# Patient Record
Sex: FEMALE | Race: WHITE | NOT HISPANIC OR LATINO | URBAN - NONMETROPOLITAN AREA
[De-identification: names, ages, dates, MRNs, and addresses within clinical notes are randomized per-mention and may not be internally consistent; named-entity substitution may affect disease eponyms.]

---

## 2022-01-05 ENCOUNTER — LAB OUTSIDE AN ENCOUNTER (OUTPATIENT)
Dept: URBAN - NONMETROPOLITAN AREA CLINIC 2 | Facility: CLINIC | Age: 27
End: 2022-01-05

## 2022-01-05 ENCOUNTER — OFFICE VISIT (OUTPATIENT)
Dept: URBAN - NONMETROPOLITAN AREA CLINIC 2 | Facility: CLINIC | Age: 27
End: 2022-01-05
Payer: OTHER GOVERNMENT

## 2022-01-05 ENCOUNTER — WEB ENCOUNTER (OUTPATIENT)
Dept: URBAN - NONMETROPOLITAN AREA CLINIC 2 | Facility: CLINIC | Age: 27
End: 2022-01-05

## 2022-01-05 DIAGNOSIS — K31.84 GASTROPARESIS: ICD-10-CM

## 2022-01-05 DIAGNOSIS — K21.9 ESOPHAGEAL REFLUX: ICD-10-CM

## 2022-01-05 DIAGNOSIS — K62.5 RECTAL BLEEDING: ICD-10-CM

## 2022-01-05 DIAGNOSIS — R13.10 DYSPHAGIA: ICD-10-CM

## 2022-01-05 PROCEDURE — 99244 OFF/OP CNSLTJ NEW/EST MOD 40: CPT | Performed by: INTERNAL MEDICINE

## 2022-01-05 RX ORDER — SERTRALINE HCL 50 MG
TAKE 1.5 TABLETS BY ORAL ROUTE DAILY TABLET ORAL 1
Qty: 0 | Refills: 0 | Status: ACTIVE | COMMUNITY
Start: 1900-01-01

## 2022-01-05 RX ORDER — METRONIDAZOLE 7.5 MG/G
1 APPLICATION CREAM TOPICAL TWICE A DAY
Qty: 1 TUBE | Refills: 1 | OUTPATIENT
Start: 2022-01-05 | End: 2022-02-02

## 2022-01-05 RX ORDER — METFORMIN HYDROCHLORIDE 500 MG/1
TAKE 1500MG DAILY TABLET, COATED ORAL
Qty: 0 | Refills: 0 | Status: ON HOLD | COMMUNITY
Start: 1900-01-01

## 2022-01-05 RX ORDER — SPIRONOLACTONE 50 MG/1
TAKE 1.5 TABLETS BY ORAL ROUTE DAILY TABLET, FILM COATED ORAL 1
Qty: 0 | Refills: 0 | Status: ON HOLD | COMMUNITY
Start: 1900-01-01

## 2022-01-05 RX ORDER — ESOMEPRAZOLE MAGNESIUM 20 MG
TAKE 1 CAPSULE BY MOUTH TWICE DAILY CAPSULE,DELAYED RELEASE (ENTERIC COATED) ORAL
Qty: 0 | Refills: 0 | Status: ON HOLD | COMMUNITY
Start: 1900-01-01

## 2022-01-05 NOTE — HPI-TODAY'S VISIT:
1/5/2022 Ming is referred by Dr. Louie Barajas for consultation of rectal bleeding and rectal pain. A copy of this referral will be sent to the referring MD.  Over the past 6 months she has had bright red blood per rectum with bowel movements along with a tearing sensation.  At times she will may bleed for several hours.  She has a bowel movement most days.  She does take Colace as needed.  We saw her back in 2016 and diagnosed her with gastroparesis.  She has been managing this with small meals and amitriptyline.  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  She is on multiple anticholinergic medications.  She does think that the Colace seems to help, she did not know she could take this regularly.  She is never had a colonoscopy, she has no weight loss or family history.  Today she is doing well otherwise but is concerned about the bleeding.  MB

## 2022-01-06 LAB
BASO (ABSOLUTE): 0.1
BASOS: 1
EOS (ABSOLUTE): 0.1
EOS: 1
HEMATOCRIT: 40.3
HEMATOLOGY COMMENTS:: (no result)
HEMOGLOBIN: 13.1
IMMATURE CELLS: (no result)
IMMATURE GRANS (ABS): 0
IMMATURE GRANULOCYTES: 0
LYMPHS (ABSOLUTE): 3.1
LYMPHS: 38
MCH: 30.4
MCHC: 32.5
MCV: 94
MONOCYTES(ABSOLUTE): 0.7
MONOCYTES: 9
NEUTROPHILS (ABSOLUTE): 4.3
NEUTROPHILS: 51
NRBC: (no result)
PLATELETS: 350
RBC: 4.31
RDW: 11.9
WBC: 8.3

## 2022-01-26 ENCOUNTER — OFFICE VISIT (OUTPATIENT)
Dept: URBAN - NONMETROPOLITAN AREA SURGERY CENTER 1 | Facility: SURGERY CENTER | Age: 27
End: 2022-01-26
Payer: OTHER GOVERNMENT

## 2022-01-26 DIAGNOSIS — K62.5 ANAL BLEEDING: ICD-10-CM

## 2022-01-26 DIAGNOSIS — K59.09 CHANGE IN BOWEL MOVEMENTS INTERMITTENT CONSTIPATION. URGENCY IN THE MORNING.: ICD-10-CM

## 2022-01-26 PROCEDURE — 45378 DIAGNOSTIC COLONOSCOPY: CPT | Performed by: INTERNAL MEDICINE

## 2022-01-26 PROCEDURE — G8907 PT DOC NO EVENTS ON DISCHARG: HCPCS | Performed by: INTERNAL MEDICINE

## 2022-02-24 ENCOUNTER — OFFICE VISIT (OUTPATIENT)
Dept: URBAN - NONMETROPOLITAN AREA CLINIC 2 | Facility: CLINIC | Age: 27
End: 2022-02-24
Payer: OTHER GOVERNMENT

## 2022-02-24 VITALS
HEART RATE: 79 BPM | WEIGHT: 175 LBS | SYSTOLIC BLOOD PRESSURE: 108 MMHG | TEMPERATURE: 96.5 F | HEIGHT: 63 IN | DIASTOLIC BLOOD PRESSURE: 72 MMHG | BODY MASS INDEX: 31.01 KG/M2

## 2022-02-24 DIAGNOSIS — K21.9 ESOPHAGEAL REFLUX: ICD-10-CM

## 2022-02-24 DIAGNOSIS — K59.01 SLOW TRANSIT CONSTIPATION: ICD-10-CM

## 2022-02-24 DIAGNOSIS — K62.5 RECTAL BLEEDING: ICD-10-CM

## 2022-02-24 DIAGNOSIS — R13.10 DYSPHAGIA: ICD-10-CM

## 2022-02-24 DIAGNOSIS — K31.84 GASTROPARESIS: ICD-10-CM

## 2022-02-24 PROCEDURE — 99214 OFFICE O/P EST MOD 30 MIN: CPT | Performed by: NURSE PRACTITIONER

## 2022-02-24 RX ORDER — ONDANSETRON HYDROCHLORIDE 4 MG/1
1 TABLET TABLET, FILM COATED ORAL ONCE A DAY
Qty: 30 | OUTPATIENT
Start: 2022-02-24

## 2022-02-24 RX ORDER — SERTRALINE HCL 50 MG
TAKE 1.5 TABLETS BY ORAL ROUTE DAILY TABLET ORAL 1
Qty: 0 | Refills: 0 | Status: ACTIVE | COMMUNITY
Start: 1900-01-01

## 2022-02-24 RX ORDER — SPIRONOLACTONE 50 MG/1
TAKE 1.5 TABLETS BY ORAL ROUTE DAILY TABLET, FILM COATED ORAL 1
Qty: 0 | Refills: 0 | Status: ON HOLD | COMMUNITY
Start: 1900-01-01

## 2022-02-24 RX ORDER — METFORMIN HYDROCHLORIDE 500 MG/1
TAKE 1500MG DAILY TABLET, COATED ORAL
Qty: 0 | Refills: 0 | Status: ON HOLD | COMMUNITY
Start: 1900-01-01

## 2022-02-24 RX ORDER — ESOMEPRAZOLE MAGNESIUM 20 MG
TAKE 1 CAPSULE BY MOUTH TWICE DAILY CAPSULE,DELAYED RELEASE (ENTERIC COATED) ORAL
Qty: 0 | Refills: 0 | Status: ON HOLD | COMMUNITY
Start: 1900-01-01

## 2022-02-24 RX ORDER — PLECANATIDE 3 MG/1
1 TABLET TABLET ORAL ONCE A DAY
Qty: 90 TABLET | Refills: 3 | OUTPATIENT
Start: 2022-02-24 | End: 2023-02-19

## 2022-02-24 NOTE — HPI-TODAY'S VISIT:
1/5/2022 Ming is referred by Dr. Louie Barajas for consultation of rectal bleeding and rectal pain. A copy of this referral will be sent to the referring MD.  Over the past 6 months she has had bright red blood per rectum with bowel movements along with a tearing sensation.  At times she will may bleed for several hours.  She has a bowel movement most days.  She does take Colace as needed.  We saw her back in 2016 and diagnosed her with gastroparesis.  She has been managing this with small meals and amitriptyline.  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  She is on multiple anticholinergic medications.  She does think that the Colace seems to help, she did not know she could take this regularly.  She is never had a colonoscopy, she has no weight loss or family history.  Today she is doing well otherwise but is concerned about the bleeding.  MB  2/24/2022 Ming presents for follow-up of rectal bleeding.  Since her last visit her colonoscopy is normal other than a fissure.  She is started eating oatmeal in the mornings and is having a more regular bowel movement but still has to strain at times.  She has failed MiraLAX and Colace in the past.  She would like to give Trulance 3 mg a try.  She does have nausea vomiting with her migraines and after binge drinking.  She requests a refill of Zofran.  Today she is doing well otherwise with no new GI complaints.  She would like to try Trulance.  MB

## 2022-08-16 ENCOUNTER — TELEPHONE ENCOUNTER (OUTPATIENT)
Dept: URBAN - NONMETROPOLITAN AREA CLINIC 2 | Facility: CLINIC | Age: 27
End: 2022-08-16

## 2022-08-16 RX ORDER — ONDANSETRON HYDROCHLORIDE 4 MG/1
1 TABLET TABLET, FILM COATED ORAL ONCE A DAY
Qty: 30 TABLET | Refills: 2
Start: 2022-02-24

## 2022-08-16 RX ORDER — PLECANATIDE 3 MG/1
1 TABLET TABLET ORAL ONCE A DAY
Qty: 90 TABLET | Refills: 3
Start: 2022-02-24 | End: 2023-08-11

## 2022-08-18 ENCOUNTER — OFFICE VISIT (OUTPATIENT)
Dept: URBAN - NONMETROPOLITAN AREA CLINIC 2 | Facility: CLINIC | Age: 27
End: 2022-08-18

## 2022-08-30 ENCOUNTER — TELEPHONE ENCOUNTER (OUTPATIENT)
Dept: URBAN - NONMETROPOLITAN AREA CLINIC 13 | Facility: CLINIC | Age: 27
End: 2022-08-30

## 2022-08-30 RX ORDER — ONDANSETRON 4 MG/1
1 TABLET ON THE TONGUE AND ALLOW TO DISSOLVE TABLET, ORALLY DISINTEGRATING ORAL
Qty: 60 TABLETS | Refills: 2 | OUTPATIENT
Start: 2022-08-31

## 2022-08-30 RX ORDER — ONDANSETRON HYDROCHLORIDE 4 MG/1
1 TABLET TABLET, FILM COATED ORAL ONCE A DAY
Qty: 30 TABLET | Refills: 2
Start: 2022-02-24

## 2023-01-12 ENCOUNTER — OFFICE VISIT (OUTPATIENT)
Dept: URBAN - NONMETROPOLITAN AREA CLINIC 2 | Facility: CLINIC | Age: 28
End: 2023-01-12
Payer: OTHER GOVERNMENT

## 2023-01-12 ENCOUNTER — WEB ENCOUNTER (OUTPATIENT)
Dept: URBAN - NONMETROPOLITAN AREA CLINIC 2 | Facility: CLINIC | Age: 28
End: 2023-01-12

## 2023-01-12 VITALS
HEIGHT: 63 IN | SYSTOLIC BLOOD PRESSURE: 124 MMHG | BODY MASS INDEX: 31.01 KG/M2 | HEART RATE: 66 BPM | WEIGHT: 175 LBS | DIASTOLIC BLOOD PRESSURE: 84 MMHG

## 2023-01-12 DIAGNOSIS — K62.5 RECTAL BLEEDING: ICD-10-CM

## 2023-01-12 DIAGNOSIS — K21.9 ESOPHAGEAL REFLUX: ICD-10-CM

## 2023-01-12 DIAGNOSIS — K31.84 GASTROPARESIS: ICD-10-CM

## 2023-01-12 DIAGNOSIS — R13.10 DYSPHAGIA: ICD-10-CM

## 2023-01-12 DIAGNOSIS — K59.01 SLOW TRANSIT CONSTIPATION: ICD-10-CM

## 2023-01-12 PROBLEM — 12063002: Status: ACTIVE | Noted: 2022-01-05

## 2023-01-12 PROCEDURE — 99214 OFFICE O/P EST MOD 30 MIN: CPT | Performed by: NURSE PRACTITIONER

## 2023-01-12 RX ORDER — ONDANSETRON HYDROCHLORIDE 4 MG/1
1 TABLET TABLET, FILM COATED ORAL ONCE A DAY
Qty: 30 TABLET | Refills: 2 | Status: ACTIVE | COMMUNITY
Start: 2022-02-24

## 2023-01-12 RX ORDER — METFORMIN HYDROCHLORIDE 500 MG/1
TAKE 1500MG DAILY TABLET, COATED ORAL
Qty: 0 | Refills: 0 | Status: ON HOLD | COMMUNITY
Start: 1900-01-01

## 2023-01-12 RX ORDER — SERTRALINE HCL 50 MG
TAKE 1.5 TABLETS BY ORAL ROUTE DAILY TABLET ORAL 1
Qty: 0 | Refills: 0 | Status: ACTIVE | COMMUNITY
Start: 1900-01-01

## 2023-01-12 RX ORDER — ESOMEPRAZOLE MAGNESIUM 20 MG
TAKE 1 CAPSULE BY MOUTH TWICE DAILY CAPSULE,DELAYED RELEASE (ENTERIC COATED) ORAL
Qty: 0 | Refills: 0 | Status: ON HOLD | COMMUNITY
Start: 1900-01-01

## 2023-01-12 RX ORDER — SPIRONOLACTONE 50 MG/1
TAKE 1.5 TABLETS BY ORAL ROUTE DAILY TABLET, FILM COATED ORAL 1
Qty: 0 | Refills: 0 | Status: ON HOLD | COMMUNITY
Start: 1900-01-01

## 2023-01-12 RX ORDER — PRUCALOPRIDE 2 MG/1
1 TABLET TABLET, FILM COATED ORAL ONCE A DAY
Qty: 90 TABLET | Refills: 3 | OUTPATIENT
Start: 2023-01-12 | End: 2024-01-07

## 2023-01-12 RX ORDER — PLECANATIDE 3 MG/1
1 TABLET TABLET ORAL ONCE A DAY
Qty: 90 TABLET | Refills: 3 | Status: ACTIVE | COMMUNITY
Start: 2022-02-24 | End: 2023-08-11

## 2023-01-12 RX ORDER — ONDANSETRON 4 MG/1
1 TABLET ON THE TONGUE AND ALLOW TO DISSOLVE TABLET, ORALLY DISINTEGRATING ORAL
Qty: 60 TABLETS | Refills: 2 | Status: ACTIVE | COMMUNITY
Start: 2022-08-31

## 2023-01-12 RX ORDER — ESOMEPRAZOLE MAGNESIUM 20 MG/1
1 CAPSULE CAPSULE, DELAYED RELEASE ORAL ONCE A DAY
Qty: 90 CAPSULE | Refills: 3 | OUTPATIENT
Start: 2023-01-12

## 2023-01-12 NOTE — HPI-TODAY'S VISIT:
1/5/2022 Ming is referred by Dr. Louie Barajas for consultation of rectal bleeding and rectal pain. A copy of this referral will be sent to the referring MD.  Over the past 6 months she has had bright red blood per rectum with bowel movements along with a tearing sensation.  At times she will may bleed for several hours.  She has a bowel movement most days.  She does take Colace as needed.  We saw her back in 2016 and diagnosed her with gastroparesis.  She has been managing this with small meals and amitriptyline.  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  She is on multiple anticholinergic medications.  She does think that the Colace seems to help, she did not know she could take this regularly.  She is never had a colonoscopy, she has no weight loss or family history.  Today she is doing well otherwise but is concerned about the bleeding.  MB  2/24/2022 Ming presents for follow-up of rectal bleeding.  Since her last visit her colonoscopy is normal other than a fissure.  She is started eating oatmeal in the mornings and is having a more regular bowel movement but still has to strain at times.  She has failed MiraLAX and Colace in the past.  She would like to give Trulance 3 mg a try.  She does have nausea vomiting with her migraines and after binge drinking.  She requests a refill of Zofran.  Today she is doing well otherwise with no new GI complaints.  She would like to try Trulance.  MB 1/12/2023 The patient presents for follow-up of reflux gastroparesis and constipation.  Since her last visit the Trulance was $200 a month.  She states that this did help her symptoms but was too expensive.  She has failed stool softeners and MiraLAX in the past.  She states she is recently had a flare of reflux.  She is not taking anything regularly for the symptoms.  Her last EGD was done in 2016 with retained food.  This was not done locally.  Today she agrees to start low-dose PPI.  We will pursue prescription for Motegrity given her gastroparesis and Meghan-Danlos.  If this is not covered consider appeal for Trulance with tier exception versus [employer].  We will follow-up pending her clinical response.  MB

## 2023-01-31 ENCOUNTER — P2P PATIENT RECORD (OUTPATIENT)
Age: 28
End: 2023-01-31

## 2023-01-31 ENCOUNTER — WEB ENCOUNTER (OUTPATIENT)
Dept: URBAN - NONMETROPOLITAN AREA CLINIC 2 | Facility: CLINIC | Age: 28
End: 2023-01-31

## 2023-01-31 RX ORDER — SERTRALINE HCL 50 MG
TAKE 1.5 TABLETS BY ORAL ROUTE DAILY TABLET ORAL 1
Qty: 0 | Refills: 0 | Status: ACTIVE | COMMUNITY
Start: 1900-01-01

## 2023-01-31 RX ORDER — ONDANSETRON 4 MG/1
1 TABLET ON THE TONGUE AND ALLOW TO DISSOLVE TABLET, ORALLY DISINTEGRATING ORAL
Qty: 60 TABLETS | Refills: 2 | Status: ACTIVE | COMMUNITY
Start: 2022-08-31

## 2023-01-31 RX ORDER — METRONIDAZOLE 7.5 MG/G
1 APPLICATION CREAM TOPICAL TWICE A DAY
Qty: 1 TUBE | Refills: 1 | OUTPATIENT
Start: 2023-01-31 | End: 2023-02-28

## 2023-01-31 RX ORDER — PLECANATIDE 3 MG/1
1 TABLET TABLET ORAL ONCE A DAY
Qty: 90 TABLET | Refills: 3 | Status: ACTIVE | COMMUNITY
Start: 2022-02-24 | End: 2023-08-11

## 2023-01-31 RX ORDER — SPIRONOLACTONE 50 MG/1
TAKE 1.5 TABLETS BY ORAL ROUTE DAILY TABLET, FILM COATED ORAL 1
Qty: 0 | Refills: 0 | Status: ON HOLD | COMMUNITY
Start: 1900-01-01

## 2023-01-31 RX ORDER — PRUCALOPRIDE 2 MG/1
1 TABLET TABLET, FILM COATED ORAL ONCE A DAY
Qty: 90 TABLET | Refills: 3 | Status: ACTIVE | COMMUNITY
Start: 2023-01-12 | End: 2024-01-07

## 2023-01-31 RX ORDER — METFORMIN HYDROCHLORIDE 500 MG/1
TAKE 1500MG DAILY TABLET, COATED ORAL
Qty: 0 | Refills: 0 | Status: ON HOLD | COMMUNITY
Start: 1900-01-01

## 2023-01-31 RX ORDER — ONDANSETRON HYDROCHLORIDE 4 MG/1
1 TABLET TABLET, FILM COATED ORAL ONCE A DAY
Qty: 30 TABLET | Refills: 2 | Status: ACTIVE | COMMUNITY
Start: 2022-02-24

## 2023-01-31 RX ORDER — ESOMEPRAZOLE MAGNESIUM 20 MG/1
1 CAPSULE CAPSULE, DELAYED RELEASE ORAL ONCE A DAY
Qty: 90 CAPSULE | Refills: 3 | Status: ACTIVE | COMMUNITY
Start: 2023-01-12

## 2023-01-31 RX ORDER — ESOMEPRAZOLE MAGNESIUM 20 MG
TAKE 1 CAPSULE BY MOUTH TWICE DAILY CAPSULE,DELAYED RELEASE (ENTERIC COATED) ORAL
Qty: 0 | Refills: 0 | Status: ON HOLD | COMMUNITY
Start: 1900-01-01

## 2023-07-13 ENCOUNTER — OFFICE VISIT (OUTPATIENT)
Dept: URBAN - NONMETROPOLITAN AREA CLINIC 2 | Facility: CLINIC | Age: 28
End: 2023-07-13
Payer: OTHER GOVERNMENT

## 2023-07-13 ENCOUNTER — WEB ENCOUNTER (OUTPATIENT)
Dept: URBAN - NONMETROPOLITAN AREA CLINIC 2 | Facility: CLINIC | Age: 28
End: 2023-07-13

## 2023-07-13 VITALS
DIASTOLIC BLOOD PRESSURE: 69 MMHG | TEMPERATURE: 98.7 F | WEIGHT: 169 LBS | HEIGHT: 63 IN | HEART RATE: 91 BPM | BODY MASS INDEX: 29.95 KG/M2 | SYSTOLIC BLOOD PRESSURE: 100 MMHG

## 2023-07-13 DIAGNOSIS — K31.84 GASTROPARESIS: ICD-10-CM

## 2023-07-13 DIAGNOSIS — K59.01 SLOW TRANSIT CONSTIPATION: ICD-10-CM

## 2023-07-13 DIAGNOSIS — K21.9 ESOPHAGEAL REFLUX: ICD-10-CM

## 2023-07-13 DIAGNOSIS — K62.5 RECTAL BLEEDING: ICD-10-CM

## 2023-07-13 DIAGNOSIS — R13.10 DYSPHAGIA: ICD-10-CM

## 2023-07-13 PROCEDURE — 99214 OFFICE O/P EST MOD 30 MIN: CPT | Performed by: NURSE PRACTITIONER

## 2023-07-13 RX ORDER — PLECANATIDE 3 MG/1
1 TABLET TABLET ORAL ONCE A DAY
Qty: 90 TABLET | Refills: 3 | Status: ACTIVE | COMMUNITY
Start: 2022-02-24 | End: 2023-08-11

## 2023-07-13 RX ORDER — ESOMEPRAZOLE MAGNESIUM 20 MG
TAKE 1 CAPSULE BY MOUTH TWICE DAILY CAPSULE,DELAYED RELEASE (ENTERIC COATED) ORAL
Qty: 0 | Refills: 0 | Status: ON HOLD | COMMUNITY
Start: 1900-01-01

## 2023-07-13 RX ORDER — ONDANSETRON HYDROCHLORIDE 4 MG/1
1 TABLET TABLET, FILM COATED ORAL ONCE A DAY
Qty: 30 TABLET | Refills: 2 | Status: ACTIVE | COMMUNITY
Start: 2022-02-24

## 2023-07-13 RX ORDER — ONDANSETRON 4 MG/1
1 TABLET ON THE TONGUE AND ALLOW TO DISSOLVE TABLET, ORALLY DISINTEGRATING ORAL
Qty: 60 TABLETS | Refills: 2 | Status: ACTIVE | COMMUNITY
Start: 2022-08-31

## 2023-07-13 RX ORDER — PRUCALOPRIDE 2 MG/1
1 TABLET TABLET, FILM COATED ORAL ONCE A DAY
Qty: 90 TABLET | Refills: 3 | Status: ACTIVE | COMMUNITY
Start: 2023-01-12 | End: 2024-01-07

## 2023-07-13 RX ORDER — SPIRONOLACTONE 50 MG/1
TAKE 1.5 TABLETS BY ORAL ROUTE DAILY TABLET, FILM COATED ORAL 1
Qty: 0 | Refills: 0 | Status: ON HOLD | COMMUNITY
Start: 1900-01-01

## 2023-07-13 RX ORDER — METFORMIN HYDROCHLORIDE 500 MG/1
TAKE 1500MG DAILY TABLET, COATED ORAL
Qty: 0 | Refills: 0 | Status: ON HOLD | COMMUNITY
Start: 1900-01-01

## 2023-07-13 RX ORDER — ESOMEPRAZOLE MAGNESIUM 20 MG/1
1 CAPSULE CAPSULE, DELAYED RELEASE ORAL ONCE A DAY
Qty: 90 CAPSULE | Refills: 3 | Status: ACTIVE | COMMUNITY
Start: 2023-01-12

## 2023-07-13 RX ORDER — ESOMEPRAZOLE MAGNESIUM 20 MG/1
1 CAPSULE CAPSULE, DELAYED RELEASE ORAL ONCE A DAY
Qty: 90 CAPSULE | Refills: 3 | OUTPATIENT
Start: 2023-07-13

## 2023-07-13 RX ORDER — SERTRALINE HCL 50 MG
TAKE 1.5 TABLETS BY ORAL ROUTE DAILY TABLET ORAL 1
Qty: 0 | Refills: 0 | Status: ACTIVE | COMMUNITY
Start: 1900-01-01

## 2023-07-13 RX ORDER — ONDANSETRON 4 MG/1
1 TABLET ON THE TONGUE AND ALLOW TO DISSOLVE TABLET, ORALLY DISINTEGRATING ORAL
Qty: 60 TABLET | Refills: 3 | OUTPATIENT
Start: 2023-07-13

## 2023-07-13 NOTE — HPI-TODAY'S VISIT:
1/5/2022 Ming is referred by Dr. Louie Barajas for consultation of rectal bleeding and rectal pain. A copy of this referral will be sent to the referring MD.  Over the past 6 months she has had bright red blood per rectum with bowel movements along with a tearing sensation.  At times she will may bleed for several hours.  She has a bowel movement most days.  She does take Colace as needed.  We saw her back in 2016 and diagnosed her with gastroparesis.  She has been managing this with small meals and amitriptyline.  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  She is on multiple anticholinergic medications.  She does think that the Colace seems to help, she did not know she could take this regularly.  She is never had a colonoscopy, she has no weight loss or family history.  Today she is doing well otherwise but is concerned about the bleeding.  MB  2/24/2022 Ming presents for follow-up of rectal bleeding.  Since her last visit her colonoscopy is normal other than a fissure.  She is started eating oatmeal in the mornings and is having a more regular bowel movement but still has to strain at times.  She has failed MiraLAX and Colace in the past.  She would like to give Trulance 3 mg a try.  She does have nausea vomiting with her migraines and after binge drinking.  She requests a refill of Zofran.  Today she is doing well otherwise with no new GI complaints.  She would like to try Trulance.  MB 1/12/2023 The patient presents for follow-up of reflux gastroparesis and constipation.  Since her last visit the Trulance was $200 a month.  She states that this did help her symptoms but was too expensive.  She has failed stool softeners and MiraLAX in the past.  She states she is recently had a flare of reflux.  She is not taking anything regularly for the symptoms.  Her last EGD was done in 2016 with retained food.  This was not done locally.  Today she agrees to start low-dose PPI.  We will pursue prescription for Motegrity given her gastroparesis and Meghan-Danlos.  If this is not covered consider appeal for Trulance with tier exception versus [employer].  We will follow-up pending her clinical response.  MB 7/13/2023 Ming presents for follow-up of gastroparesis and CIC.  She is doing great on Motegrity 2 mg daily with a bowel movement most days of the week.  She still has occasional episodes of nausea, her reflux actually improved as far as her symptoms but her dental appointment reveals poor dentition related to reflux.  She agrees to start as omeprazole 20 mg daily.  We will refill her Zofran.  Today she is doing much better, we will appeal for to [employer] coverage of Motegrity given its high expense.  MB

## 2023-08-18 ENCOUNTER — P2P PATIENT RECORD (OUTPATIENT)
Age: 28
End: 2023-08-18

## 2023-08-31 ENCOUNTER — WEB ENCOUNTER (OUTPATIENT)
Dept: URBAN - NONMETROPOLITAN AREA CLINIC 2 | Facility: CLINIC | Age: 28
End: 2023-08-31

## 2023-09-12 ENCOUNTER — TELEPHONE ENCOUNTER (OUTPATIENT)
Dept: URBAN - NONMETROPOLITAN AREA CLINIC 2 | Facility: CLINIC | Age: 28
End: 2023-09-12

## 2023-09-12 RX ORDER — PRUCALOPRIDE 2 MG/1
1 TABLET TABLET, FILM COATED ORAL ONCE A DAY
Qty: 90 TABLET | Refills: 3 | OUTPATIENT
Start: 2023-09-12 | End: 2024-09-06

## 2023-11-27 ENCOUNTER — WEB ENCOUNTER (OUTPATIENT)
Dept: URBAN - NONMETROPOLITAN AREA CLINIC 2 | Facility: CLINIC | Age: 28
End: 2023-11-27

## 2023-11-28 ENCOUNTER — WEB ENCOUNTER (OUTPATIENT)
Dept: URBAN - NONMETROPOLITAN AREA CLINIC 2 | Facility: CLINIC | Age: 28
End: 2023-11-28

## 2023-11-29 LAB
A/G RATIO: 1.9
ABSOLUTE BASOPHILS: 42
ABSOLUTE EOSINOPHILS: 66
ABSOLUTE LYMPHOCYTES: 2166
ABSOLUTE MONOCYTES: 639
ABSOLUTE NEUTROPHILS: 5387
ALBUMIN: 4.5
ALKALINE PHOSPHATASE: 16
ALT (SGPT): 10
AST (SGOT): 14
BASOPHILS: 0.5
BILIRUBIN, TOTAL: 0.5
BUN/CREATININE RATIO: 15
BUN: 15
C-REACTIVE PROTEIN, QUANT: 1.1
CALCIUM: 9.5
CARBON DIOXIDE, TOTAL: 23
CHLORIDE: 100
CREATININE: 1.02
EGFR: 77
EOSINOPHILS: 0.8
GLOBULIN, TOTAL: 2.4
GLUCOSE: 74
HEMATOCRIT: 36.8
HEMOGLOBIN: 12.5
IMMUNOGLOBULIN A: 262
INTERPRETATION: (no result)
LYMPHOCYTES: 26.1
MCH: 30.5
MCHC: 34
MCV: 89.8
MONOCYTES: 7.7
MPV: 10.7
NEUTROPHILS: 64.9
PLATELET COUNT: 356
POTASSIUM: 4.1
PROTEIN, TOTAL: 6.9
RDW: 13.1
RED BLOOD CELL COUNT: 4.1
SED RATE BY MODIFIED: 6
SODIUM: 136
TISSUE TRANSGLUTAMINASE AB, IGA: <1
TSH W/REFLEX TO FT4: 1.39
WHITE BLOOD CELL COUNT: 8.3

## 2023-12-04 ENCOUNTER — OFFICE VISIT (OUTPATIENT)
Dept: URBAN - NONMETROPOLITAN AREA CLINIC 2 | Facility: CLINIC | Age: 28
End: 2023-12-04
Payer: OTHER GOVERNMENT

## 2023-12-04 ENCOUNTER — LAB OUTSIDE AN ENCOUNTER (OUTPATIENT)
Dept: URBAN - NONMETROPOLITAN AREA CLINIC 2 | Facility: CLINIC | Age: 28
End: 2023-12-04

## 2023-12-04 VITALS
BODY MASS INDEX: 27.29 KG/M2 | HEIGHT: 63 IN | DIASTOLIC BLOOD PRESSURE: 76 MMHG | WEIGHT: 154 LBS | HEART RATE: 85 BPM | SYSTOLIC BLOOD PRESSURE: 114 MMHG | TEMPERATURE: 98.6 F

## 2023-12-04 DIAGNOSIS — K21.9 ESOPHAGEAL REFLUX: ICD-10-CM

## 2023-12-04 DIAGNOSIS — K62.5 RECTAL BLEEDING: ICD-10-CM

## 2023-12-04 DIAGNOSIS — R13.10 DYSPHAGIA: ICD-10-CM

## 2023-12-04 DIAGNOSIS — K31.84 GASTROPARESIS: ICD-10-CM

## 2023-12-04 DIAGNOSIS — K59.01 SLOW TRANSIT CONSTIPATION: ICD-10-CM

## 2023-12-04 PROCEDURE — 99214 OFFICE O/P EST MOD 30 MIN: CPT | Performed by: NURSE PRACTITIONER

## 2023-12-04 RX ORDER — ESOMEPRAZOLE MAGNESIUM 20 MG/1
1 CAPSULE CAPSULE, DELAYED RELEASE ORAL ONCE A DAY
Qty: 90 CAPSULE | Refills: 3 | Status: ACTIVE | COMMUNITY
Start: 2023-01-12

## 2023-12-04 RX ORDER — ESOMEPRAZOLE MAGNESIUM 20 MG/1
1 CAPSULE CAPSULE, DELAYED RELEASE ORAL ONCE A DAY
Qty: 90 CAPSULE | Refills: 3 | Status: ACTIVE | COMMUNITY
Start: 2023-07-13

## 2023-12-04 RX ORDER — METOCLOPRAMIDE HYDROCHLORIDE 15 MG/.07ML
1 SPRAY 30 MINUTES BEFORE MEALS AND AT BEDTIME IN ONE NOSTRIL SPRAY NASAL TWICE DAILY
Qty: 1 UNSPECIFIED | Refills: 3 | OUTPATIENT
Start: 2023-12-04

## 2023-12-04 RX ORDER — PRUCALOPRIDE 2 MG/1
1 TABLET TABLET, FILM COATED ORAL ONCE A DAY
Qty: 90 TABLET | Refills: 3 | Status: ACTIVE | COMMUNITY
Start: 2023-09-12 | End: 2024-09-06

## 2023-12-04 RX ORDER — ESOMEPRAZOLE MAGNESIUM 20 MG
TAKE 1 CAPSULE BY MOUTH TWICE DAILY CAPSULE,DELAYED RELEASE (ENTERIC COATED) ORAL
Qty: 0 | Refills: 0 | Status: ON HOLD | COMMUNITY
Start: 1900-01-01

## 2023-12-04 RX ORDER — ONDANSETRON 4 MG/1
1 TABLET ON THE TONGUE AND ALLOW TO DISSOLVE TABLET, ORALLY DISINTEGRATING ORAL
Qty: 60 TABLETS | Refills: 2 | Status: ACTIVE | COMMUNITY
Start: 2022-08-31

## 2023-12-04 RX ORDER — ONDANSETRON HYDROCHLORIDE 4 MG/1
1 TABLET TABLET, FILM COATED ORAL ONCE A DAY
Qty: 30 TABLET | Refills: 2 | Status: ACTIVE | COMMUNITY
Start: 2022-02-24

## 2023-12-04 RX ORDER — SPIRONOLACTONE 50 MG/1
TAKE 1.5 TABLETS BY ORAL ROUTE DAILY TABLET, FILM COATED ORAL 1
Qty: 0 | Refills: 0 | Status: ON HOLD | COMMUNITY
Start: 1900-01-01

## 2023-12-04 RX ORDER — PRUCALOPRIDE 2 MG/1
1 TABLET TABLET, FILM COATED ORAL ONCE A DAY
Qty: 90 TABLET | Refills: 3 | Status: ACTIVE | COMMUNITY
Start: 2023-01-12 | End: 2024-01-07

## 2023-12-04 RX ORDER — SERTRALINE HCL 50 MG
TAKE 1.5 TABLETS BY ORAL ROUTE DAILY TABLET ORAL 1
Qty: 0 | Refills: 0 | Status: ACTIVE | COMMUNITY
Start: 1900-01-01

## 2023-12-04 RX ORDER — ESOMEPRAZOLE MAGNESIUM 20 MG/1
1 CAPSULE CAPSULE, DELAYED RELEASE ORAL TWICE DAILY
Qty: 180 CAPSULES | Refills: 3
Start: 2023-01-12

## 2023-12-04 RX ORDER — METFORMIN HYDROCHLORIDE 500 MG/1
TAKE 1500MG DAILY TABLET, COATED ORAL
Qty: 0 | Refills: 0 | Status: ON HOLD | COMMUNITY
Start: 1900-01-01

## 2023-12-04 RX ORDER — ONDANSETRON 4 MG/1
1 TABLET ON THE TONGUE AND ALLOW TO DISSOLVE TABLET, ORALLY DISINTEGRATING ORAL
Qty: 60 TABLET | Refills: 3 | Status: ACTIVE | COMMUNITY
Start: 2023-07-13

## 2023-12-04 NOTE — HPI-TODAY'S VISIT:
1/5/2022 Ming is referred by Dr. Louie Barajas for consultation of rectal bleeding and rectal pain. A copy of this referral will be sent to the referring MD.  Over the past 6 months she has had bright red blood per rectum with bowel movements along with a tearing sensation.  At times she will may bleed for several hours.  She has a bowel movement most days.  She does take Colace as needed.  We saw her back in 2016 and diagnosed her with gastroparesis.  She has been managing this with small meals and amitriptyline.  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  She is on multiple anticholinergic medications.  She does think that the Colace seems to help, she did not know she could take this regularly.  She is never had a colonoscopy, she has no weight loss or family history.  Today she is doing well otherwise but is concerned about the bleeding.  MB  2/24/2022 Ming presents for follow-up of rectal bleeding.  Since her last visit her colonoscopy is normal other than a fissure.  She is started eating oatmeal in the mornings and is having a more regular bowel movement but still has to strain at times.  She has failed MiraLAX and Colace in the past.  She would like to give Trulance 3 mg a try.  She does have nausea vomiting with her migraines and after binge drinking.  She requests a refill of Zofran.  Today she is doing well otherwise with no new GI complaints.  She would like to try Trulance.  MB 1/12/2023 The patient presents for follow-up of reflux gastroparesis and constipation.  Since her last visit the Trulance was $200 a month.  She states that this did help her symptoms but was too expensive.  She has failed stool softeners and MiraLAX in the past.  She states she is recently had a flare of reflux.  She is not taking anything regularly for the symptoms.  Her last EGD was done in 2016 with retained food.  This was not done locally.  Today she agrees to start low-dose PPI.  We will pursue prescription for Motegrity given her gastroparesis and Meghan-Danlos.  If this is not covered consider appeal for Trulance with tier exception versus [employer].  We will follow-up pending her clinical response.  MB 7/13/2023 Ming presents for follow-up of gastroparesis and CIC.  She is doing great on Motegrity 2 mg daily with a bowel movement most days of the week.  She still has occasional episodes of nausea, her reflux actually improved as far as her symptoms but her dental appointment reveals poor dentition related to reflux.  She agrees to start as omeprazole 20 mg daily.  We will refill her Zofran.  Today she is doing much better, we will appeal for to [employer] coverage of Motegrity given its high expense.  MB 12/4/2023 Lorie presents for follow-up of reflux and gastroparesis with flare.  The September she started applying for grad school and break-up with her boyfriend.  She had increased reflux with gurgling along with nausea and dry heaves and occasional vomiting.  She agrees to increase her as omeprazole to 20 mg twice daily.  She is on metformin for insulin resistance and wants to hold off on weaning this.  She does agree to a trial of Chris OD, we have discussed risk including tardive dyskinesia with metoclopramide.  We will schedule repeat upper endoscopy to rule out eosinophilic esophagitis/gastritis.  We will follow-up pending her response.  Consider a course of Xifaxan, she cannot take amitriptyline due to palpitations associated with this.  MB

## 2023-12-06 LAB — RESULT:: (no result)

## 2023-12-19 ENCOUNTER — WEB ENCOUNTER (OUTPATIENT)
Dept: URBAN - NONMETROPOLITAN AREA CLINIC 2 | Facility: CLINIC | Age: 28
End: 2023-12-19

## 2023-12-20 ENCOUNTER — TELEPHONE ENCOUNTER (OUTPATIENT)
Dept: URBAN - NONMETROPOLITAN AREA CLINIC 2 | Facility: CLINIC | Age: 28
End: 2023-12-20

## 2024-01-04 ENCOUNTER — WEB ENCOUNTER (OUTPATIENT)
Dept: URBAN - NONMETROPOLITAN AREA CLINIC 2 | Facility: CLINIC | Age: 29
End: 2024-01-04

## 2024-01-05 ENCOUNTER — OFFICE VISIT (OUTPATIENT)
Dept: URBAN - NONMETROPOLITAN AREA SURGERY CENTER 1 | Facility: SURGERY CENTER | Age: 29
End: 2024-01-05

## 2024-01-11 ENCOUNTER — LAB OUTSIDE AN ENCOUNTER (OUTPATIENT)
Dept: URBAN - NONMETROPOLITAN AREA CLINIC 2 | Facility: CLINIC | Age: 29
End: 2024-01-11

## 2024-01-11 ENCOUNTER — OFFICE VISIT (OUTPATIENT)
Dept: URBAN - METROPOLITAN AREA MEDICAL CENTER 1 | Facility: MEDICAL CENTER | Age: 29
End: 2024-01-11
Payer: OTHER GOVERNMENT

## 2024-01-11 DIAGNOSIS — K29.60 ADENOPAPILLOMATOSIS GASTRICA: ICD-10-CM

## 2024-01-11 DIAGNOSIS — K22.89 OTHER SPECIFIED DISEASE OF ESOPHAGUS: ICD-10-CM

## 2024-01-11 PROCEDURE — 43239 EGD BIOPSY SINGLE/MULTIPLE: CPT | Performed by: INTERNAL MEDICINE

## 2024-01-11 RX ORDER — ESOMEPRAZOLE MAGNESIUM 20 MG/1
1 CAPSULE CAPSULE, DELAYED RELEASE ORAL TWICE DAILY
Qty: 180 CAPSULES | Refills: 3 | Status: ACTIVE | COMMUNITY
Start: 2023-01-12

## 2024-01-11 RX ORDER — ONDANSETRON HYDROCHLORIDE 4 MG/1
1 TABLET TABLET, FILM COATED ORAL ONCE A DAY
Qty: 30 TABLET | Refills: 2 | Status: ACTIVE | COMMUNITY
Start: 2022-02-24

## 2024-01-11 RX ORDER — ONDANSETRON 4 MG/1
1 TABLET ON THE TONGUE AND ALLOW TO DISSOLVE TABLET, ORALLY DISINTEGRATING ORAL
Qty: 60 TABLET | Refills: 3 | Status: ACTIVE | COMMUNITY
Start: 2023-07-13

## 2024-01-11 RX ORDER — ONDANSETRON 4 MG/1
1 TABLET ON THE TONGUE AND ALLOW TO DISSOLVE TABLET, ORALLY DISINTEGRATING ORAL
Qty: 60 TABLETS | Refills: 2 | Status: ACTIVE | COMMUNITY
Start: 2022-08-31

## 2024-01-11 RX ORDER — SERTRALINE HCL 50 MG
TAKE 1.5 TABLETS BY ORAL ROUTE DAILY TABLET ORAL 1
Qty: 0 | Refills: 0 | Status: ACTIVE | COMMUNITY
Start: 1900-01-01

## 2024-01-11 RX ORDER — PRUCALOPRIDE 2 MG/1
1 TABLET TABLET, FILM COATED ORAL ONCE A DAY
Qty: 90 TABLET | Refills: 3 | Status: ACTIVE | COMMUNITY
Start: 2023-09-12 | End: 2024-09-06

## 2024-01-11 RX ORDER — ESOMEPRAZOLE MAGNESIUM 20 MG/1
1 CAPSULE CAPSULE, DELAYED RELEASE ORAL ONCE A DAY
Qty: 90 CAPSULE | Refills: 3 | Status: ACTIVE | COMMUNITY
Start: 2023-07-13

## 2024-01-11 RX ORDER — METOCLOPRAMIDE HYDROCHLORIDE 15 MG/.07ML
1 SPRAY 30 MINUTES BEFORE MEALS AND AT BEDTIME IN ONE NOSTRIL SPRAY NASAL TWICE DAILY
Qty: 1 UNSPECIFIED | Refills: 3 | Status: ACTIVE | COMMUNITY
Start: 2023-12-04

## 2024-01-11 RX ORDER — SPIRONOLACTONE 50 MG/1
TAKE 1.5 TABLETS BY ORAL ROUTE DAILY TABLET, FILM COATED ORAL 1
Qty: 0 | Refills: 0 | Status: ON HOLD | COMMUNITY
Start: 1900-01-01

## 2024-01-11 RX ORDER — ESOMEPRAZOLE MAGNESIUM 20 MG
TAKE 1 CAPSULE BY MOUTH TWICE DAILY CAPSULE,DELAYED RELEASE (ENTERIC COATED) ORAL
Qty: 0 | Refills: 0 | Status: ON HOLD | COMMUNITY
Start: 1900-01-01

## 2024-01-11 RX ORDER — METFORMIN HYDROCHLORIDE 500 MG/1
TAKE 1500MG DAILY TABLET, COATED ORAL
Qty: 0 | Refills: 0 | Status: ON HOLD | COMMUNITY
Start: 1900-01-01

## 2024-01-12 LAB
AP CASE REPORT: (no result)
AP FINAL DIAGNOSIS: (no result)
AP GROSS DESCRIPTION: (no result)
AP MICROSCOPIC DESCRIPTION: (no result)

## 2024-01-25 ENCOUNTER — OFFICE VISIT (OUTPATIENT)
Dept: URBAN - NONMETROPOLITAN AREA CLINIC 2 | Facility: CLINIC | Age: 29
End: 2024-01-25
Payer: OTHER GOVERNMENT

## 2024-01-25 ENCOUNTER — DASHBOARD ENCOUNTERS (OUTPATIENT)
Age: 29
End: 2024-01-25

## 2024-01-25 VITALS
WEIGHT: 145 LBS | DIASTOLIC BLOOD PRESSURE: 70 MMHG | SYSTOLIC BLOOD PRESSURE: 99 MMHG | TEMPERATURE: 98 F | HEART RATE: 101 BPM | BODY MASS INDEX: 25.69 KG/M2 | HEIGHT: 63 IN

## 2024-01-25 DIAGNOSIS — K59.01 SLOW TRANSIT CONSTIPATION: ICD-10-CM

## 2024-01-25 DIAGNOSIS — R13.10 DYSPHAGIA: ICD-10-CM

## 2024-01-25 DIAGNOSIS — K21.9 ESOPHAGEAL REFLUX: ICD-10-CM

## 2024-01-25 DIAGNOSIS — R19.4 CHANGE IN BOWEL HABITS: ICD-10-CM

## 2024-01-25 DIAGNOSIS — K62.5 RECTAL BLEEDING: ICD-10-CM

## 2024-01-25 DIAGNOSIS — K31.84 GASTROPARESIS: ICD-10-CM

## 2024-01-25 PROBLEM — 235675006: Status: ACTIVE | Noted: 2022-01-05

## 2024-01-25 PROBLEM — 35298007: Status: ACTIVE | Noted: 2022-02-24

## 2024-01-25 PROBLEM — 88111009: Status: ACTIVE | Noted: 2024-01-25

## 2024-01-25 PROCEDURE — 99214 OFFICE O/P EST MOD 30 MIN: CPT | Performed by: NURSE PRACTITIONER

## 2024-01-25 RX ORDER — ONDANSETRON HYDROCHLORIDE 4 MG/1
1 TABLET TABLET, FILM COATED ORAL
Qty: 60 TABLET | Refills: 1 | OUTPATIENT
Start: 2024-01-25

## 2024-01-25 RX ORDER — METFORMIN HYDROCHLORIDE 500 MG/1
TAKE 1500MG DAILY TABLET, COATED ORAL
Qty: 0 | Refills: 0 | Status: ON HOLD | COMMUNITY
Start: 1900-01-01

## 2024-01-25 RX ORDER — PRUCALOPRIDE 2 MG/1
1 TABLET TABLET, FILM COATED ORAL ONCE A DAY
Qty: 90 TABLET | Refills: 3 | Status: ACTIVE | COMMUNITY
Start: 2023-09-12 | End: 2024-09-06

## 2024-01-25 RX ORDER — SERTRALINE HCL 50 MG
TAKE 1.5 TABLETS BY ORAL ROUTE DAILY TABLET ORAL 1
Qty: 0 | Refills: 0 | Status: ACTIVE | COMMUNITY
Start: 1900-01-01

## 2024-01-25 RX ORDER — ESOMEPRAZOLE MAGNESIUM 20 MG
TAKE 1 CAPSULE BY MOUTH TWICE DAILY CAPSULE,DELAYED RELEASE (ENTERIC COATED) ORAL
Qty: 0 | Refills: 0 | Status: ON HOLD | COMMUNITY
Start: 1900-01-01

## 2024-01-25 RX ORDER — METOCLOPRAMIDE HYDROCHLORIDE 15 MG/.07ML
1 SPRAY 30 MINUTES BEFORE MEALS AND AT BEDTIME IN ONE NOSTRIL SPRAY NASAL TWICE DAILY
Qty: 1 UNSPECIFIED | Refills: 3 | Status: ACTIVE | COMMUNITY
Start: 2023-12-04

## 2024-01-25 RX ORDER — ONDANSETRON 4 MG/1
1 TABLET ON THE TONGUE AND ALLOW TO DISSOLVE TABLET, ORALLY DISINTEGRATING ORAL
Qty: 60 TABLETS | Refills: 2 | Status: ACTIVE | COMMUNITY
Start: 2022-08-31

## 2024-01-25 RX ORDER — ONDANSETRON 4 MG/1
1 TABLET ON THE TONGUE AND ALLOW TO DISSOLVE TABLET, ORALLY DISINTEGRATING ORAL
Qty: 60 TABLET | Refills: 3 | Status: ACTIVE | COMMUNITY
Start: 2023-07-13

## 2024-01-25 RX ORDER — METRONIDAZOLE 7.5 MG/G
1 APPLICATION CREAM TOPICAL TWICE A DAY
Qty: 1 UNSPECIFIED | Refills: 0 | OUTPATIENT
Start: 2024-01-25 | End: 2024-02-08

## 2024-01-25 RX ORDER — ESOMEPRAZOLE MAGNESIUM 20 MG/1
1 CAPSULE CAPSULE, DELAYED RELEASE ORAL ONCE A DAY
Qty: 90 CAPSULE | Refills: 3 | Status: ACTIVE | COMMUNITY
Start: 2023-07-13

## 2024-01-25 RX ORDER — ONDANSETRON HYDROCHLORIDE 4 MG/1
1 TABLET TABLET, FILM COATED ORAL ONCE A DAY
Qty: 30 TABLET | Refills: 2 | Status: ACTIVE | COMMUNITY
Start: 2022-02-24

## 2024-01-25 RX ORDER — ESOMEPRAZOLE MAGNESIUM 20 MG/1
1 CAPSULE CAPSULE, DELAYED RELEASE ORAL TWICE DAILY
Qty: 180 CAPSULES | Refills: 3 | Status: ACTIVE | COMMUNITY
Start: 2023-01-12

## 2024-01-25 RX ORDER — SPIRONOLACTONE 50 MG/1
TAKE 1.5 TABLETS BY ORAL ROUTE DAILY TABLET, FILM COATED ORAL 1
Qty: 0 | Refills: 0 | Status: ON HOLD | COMMUNITY
Start: 1900-01-01

## 2024-01-25 NOTE — HPI-TODAY'S VISIT:
1/5/2022 Ming is referred by Dr. Louie Barajas for consultation of rectal bleeding and rectal pain. A copy of this referral will be sent to the referring MD.  Over the past 6 months she has had bright red blood per rectum with bowel movements along with a tearing sensation.  At times she will may bleed for several hours.  She has a bowel movement most days.  She does take Colace as needed.  We saw her back in 2016 and diagnosed her with gastroparesis.  She has been managing this with small meals and amitriptyline.  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  She is on multiple anticholinergic medications.  She does think that the Colace seems to help, she did not know she could take this regularly.  She is never had a colonoscopy, she has no weight loss or family history.  Today she is doing well otherwise but is concerned about the bleeding.  MB  2/24/2022 Ming presents for follow-up of rectal bleeding.  Since her last visit her colonoscopy is normal other than a fissure.  She is started eating oatmeal in the mornings and is having a more regular bowel movement but still has to strain at times.  She has failed MiraLAX and Colace in the past.  She would like to give Trulance 3 mg a try.  She does have nausea vomiting with her migraines and after binge drinking.  She requests a refill of Zofran.  Today she is doing well otherwise with no new GI complaints.  She would like to try Trulance.  MB 1/12/2023 The patient presents for follow-up of reflux gastroparesis and constipation.  Since her last visit the Trulance was $200 a month.  She states that this did help her symptoms but was too expensive.  She has failed stool softeners and MiraLAX in the past.  She states she is recently had a flare of reflux.  She is not taking anything regularly for the symptoms.  Her last EGD was done in 2016 with retained food.  This was not done locally.  Today she agrees to start low-dose PPI.  We will pursue prescription for Motegrity given her gastroparesis and Meghan-Danlos.  If this is not covered consider appeal for Trulance with tier exception versus [employer].  We will follow-up pending her clinical response.  MB 7/13/2023 Ming presents for follow-up of gastroparesis and CIC.  She is doing great on Motegrity 2 mg daily with a bowel movement most days of the week.  She still has occasional episodes of nausea, her reflux actually improved as far as her symptoms but her dental appointment reveals poor dentition related to reflux.  She agrees to start as omeprazole 20 mg daily.  We will refill her Zofran.  Today she is doing much better, we will appeal for to [facility] coverage of Motegrity given its high expense.  MB 12/4/2023 Lorie presents for follow-up of reflux and gastroparesis with flare.  The September she started applying for grad school and break-up with her boyfriend.  She had increased reflux with gurgling along with nausea and dry heaves and occasional vomiting.  She agrees to increase her as omeprazole to 20 mg twice daily.  She is on metformin for insulin resistance and wants to hold off on weaning this.  She does agree to a trial of Chris OD, we have discussed risk including tardive dyskinesia with metoclopramide.  We will schedule repeat upper endoscopy to rule out eosinophilic esophagitis/gastritis.  We will follow-up pending her response.  Consider a course of Xifaxan, she cannot take amitriptyline due to palpitations associated with this.  MB 1/25/2024 Lorie presents for endoscopy follow-up.  Her EGD reveals mild reflux and gastritis.  She took 2 doses of the Chris Yobany and had severe fatigue.  However she does feel like this is resolved her nausea.  She is on episodes omeprazole 20 mg twice daily.  She had no further vomiting.  She did still has early satiety and some postprandial dyspepsia and nausea but this is improved.  Her bowels are moving daily on Motegrity.  Today she is doing much better.  MB

## 2024-01-27 ENCOUNTER — LAB OUTSIDE AN ENCOUNTER (OUTPATIENT)
Dept: URBAN - NONMETROPOLITAN AREA CLINIC 2 | Facility: CLINIC | Age: 29
End: 2024-01-27

## 2024-01-28 ENCOUNTER — LAB OUTSIDE AN ENCOUNTER (OUTPATIENT)
Dept: URBAN - NONMETROPOLITAN AREA CLINIC 2 | Facility: CLINIC | Age: 29
End: 2024-01-28

## 2024-02-07 ENCOUNTER — TELEP (OUTPATIENT)
Dept: URBAN - METROPOLITAN AREA TELEHEALTH 2 | Facility: TELEHEALTH | Age: 29
End: 2024-02-07

## 2024-05-06 ENCOUNTER — WEB ENCOUNTER (OUTPATIENT)
Dept: URBAN - NONMETROPOLITAN AREA CLINIC 2 | Facility: CLINIC | Age: 29
End: 2024-05-06

## 2024-05-06 RX ORDER — ESOMEPRAZOLE MAGNESIUM 20 MG/1
1 CAPSULE CAPSULE, DELAYED RELEASE ORAL TWICE DAILY
Qty: 180 CAPSULES | Refills: 3
Start: 2023-01-12

## 2024-05-23 ENCOUNTER — ERX REFILL RESPONSE (OUTPATIENT)
Dept: URBAN - NONMETROPOLITAN AREA CLINIC 2 | Facility: CLINIC | Age: 29
End: 2024-05-23

## 2024-05-23 ENCOUNTER — WEB ENCOUNTER (OUTPATIENT)
Dept: URBAN - NONMETROPOLITAN AREA CLINIC 2 | Facility: CLINIC | Age: 29
End: 2024-05-23

## 2024-05-23 RX ORDER — PRUCALOPRIDE 2 MG/1
TAKE 1 TABLET BY MOUTH EVERY DAY TABLET, FILM COATED ORAL
Qty: 90 TABLET | Refills: 0 | OUTPATIENT

## 2024-05-23 RX ORDER — PRUCALOPRIDE 2 MG/1
TAKE 1 TABLET BY MOUTH EVERY DAY TABLET, FILM COATED ORAL
Qty: 90 TABLET | Refills: 3 | OUTPATIENT

## 2024-07-17 ENCOUNTER — OFFICE VISIT (OUTPATIENT)
Dept: URBAN - METROPOLITAN AREA TELEHEALTH 2 | Facility: TELEHEALTH | Age: 29
End: 2024-07-17
Payer: OTHER GOVERNMENT

## 2024-07-17 ENCOUNTER — WEB ENCOUNTER (OUTPATIENT)
Dept: URBAN - NONMETROPOLITAN AREA CLINIC 2 | Facility: CLINIC | Age: 29
End: 2024-07-17

## 2024-07-17 DIAGNOSIS — K21.9 ESOPHAGEAL REFLUX: ICD-10-CM

## 2024-07-17 DIAGNOSIS — K62.5 RECTAL BLEEDING: ICD-10-CM

## 2024-07-17 DIAGNOSIS — K59.01 SLOW TRANSIT CONSTIPATION: ICD-10-CM

## 2024-07-17 DIAGNOSIS — K31.84 GASTROPARESIS: ICD-10-CM

## 2024-07-17 PROCEDURE — 99214 OFFICE O/P EST MOD 30 MIN: CPT | Performed by: NURSE PRACTITIONER

## 2024-07-17 RX ORDER — SERTRALINE HCL 50 MG
TAKE 1.5 TABLETS BY ORAL ROUTE DAILY TABLET ORAL 1
Qty: 0 | Refills: 0 | Status: ACTIVE | COMMUNITY
Start: 1900-01-01

## 2024-07-17 RX ORDER — ONDANSETRON 4 MG/1
1 TABLET ON THE TONGUE AND ALLOW TO DISSOLVE TABLET, ORALLY DISINTEGRATING ORAL
Qty: 60 TABLET | Refills: 3 | Status: ACTIVE | COMMUNITY
Start: 2023-07-13

## 2024-07-17 RX ORDER — ESOMEPRAZOLE MAGNESIUM 20 MG/1
TAKE 1 CAPSULE BY MOUTH TWICE DAILY CAPSULE, DELAYED RELEASE ORAL
Qty: 0 | Refills: 0 | Status: ON HOLD | COMMUNITY
Start: 1900-01-01

## 2024-07-17 RX ORDER — PRUCALOPRIDE 2 MG/1
TAKE 1 TABLET BY MOUTH EVERY DAY TABLET, FILM COATED ORAL
Qty: 90 TABLET | Refills: 3 | Status: ACTIVE | COMMUNITY

## 2024-07-17 RX ORDER — SPIRONOLACTONE 50 MG/1
TAKE 1.5 TABLETS BY ORAL ROUTE DAILY TABLET, FILM COATED ORAL 1
Qty: 0 | Refills: 0 | Status: ON HOLD | COMMUNITY
Start: 1900-01-01

## 2024-07-17 RX ORDER — ONDANSETRON HYDROCHLORIDE 4 MG/1
1 TABLET TABLET, FILM COATED ORAL
Qty: 60 TABLET | Refills: 1 | Status: ACTIVE | COMMUNITY
Start: 2024-01-25

## 2024-07-17 RX ORDER — ONDANSETRON HYDROCHLORIDE 4 MG/1
1 TABLET TABLET, FILM COATED ORAL ONCE A DAY
Qty: 30 TABLET | Refills: 2 | Status: ACTIVE | COMMUNITY
Start: 2022-02-24

## 2024-07-17 RX ORDER — METFORMIN HYDROCHLORIDE 500 MG/1
TAKE 1500MG DAILY TABLET, COATED ORAL
Qty: 0 | Refills: 0 | Status: ON HOLD | COMMUNITY
Start: 1900-01-01

## 2024-07-17 RX ORDER — ESOMEPRAZOLE MAGNESIUM 20 MG/1
1 CAPSULE CAPSULE, DELAYED RELEASE ORAL ONCE A DAY
Qty: 90 CAPSULE | Refills: 3 | Status: ACTIVE | COMMUNITY
Start: 2023-07-13

## 2024-07-17 RX ORDER — ONDANSETRON 4 MG/1
1 TABLET ON THE TONGUE AND ALLOW TO DISSOLVE TABLET, ORALLY DISINTEGRATING ORAL
Qty: 60 TABLETS | Refills: 2 | Status: ACTIVE | COMMUNITY
Start: 2022-08-31

## 2024-07-17 RX ORDER — ESOMEPRAZOLE MAGNESIUM 20 MG/1
1 CAPSULE CAPSULE, DELAYED RELEASE ORAL TWICE DAILY
Qty: 180 CAPSULES | Refills: 3 | Status: ACTIVE | COMMUNITY
Start: 2023-01-12

## 2024-07-17 RX ORDER — METOCLOPRAMIDE HYDROCHLORIDE 15 MG/.07ML
1 SPRAY 30 MINUTES BEFORE MEALS AND AT BEDTIME IN ONE NOSTRIL SPRAY NASAL TWICE DAILY
Qty: 1 UNSPECIFIED | Refills: 3 | Status: ACTIVE | COMMUNITY
Start: 2023-12-04

## 2024-07-17 NOTE — HPI-TODAY'S VISIT:
1/5/2022 Ming is referred by Dr. Louie Barajas for consultation of rectal bleeding and rectal pain. A copy of this referral will be sent to the referring MD.  Over the past 6 months she has had bright red blood per rectum with bowel movements along with a tearing sensation.  At times she will may bleed for several hours.  She has a bowel movement most days.  She does take Colace as needed.  We saw her back in 2016 and diagnosed her with gastroparesis.  She has been managing this with small meals and amitriptyline.  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  She is on multiple anticholinergic medications.  She does think that the Colace seems to help, she did not know she could take this regularly.  She is never had a colonoscopy, she has no weight loss or family history.  Today she is doing well otherwise but is concerned about the bleeding.  MB  2/24/2022 Ming presents for follow-up of rectal bleeding.  Since her last visit her colonoscopy is normal other than a fissure.  She is started eating oatmeal in the mornings and is having a more regular bowel movement but still has to strain at times.  She has failed MiraLAX and Colace in the past.  She would like to give Trulance 3 mg a try.  She does have nausea vomiting with her migraines and after binge drinking.  She requests a refill of Zofran.  Today she is doing well otherwise with no new GI complaints.  She would like to try Trulance.  MB 1/12/2023 The patient presents for follow-up of reflux gastroparesis and constipation.  Since her last visit the Trulance was $200 a month.  She states that this did help her symptoms but was too expensive.  She has failed stool softeners and MiraLAX in the past.  She states she is recently had a flare of reflux.  She is not taking anything regularly for the symptoms.  Her last EGD was done in 2016 with retained food.  This was not done locally.  Today she agrees to start low-dose PPI.  We will pursue prescription for Motegrity given her gastroparesis and Meghan-Danlos.  If this is not covered consider appeal for Trulance with tier exception versus [employer].  We will follow-up pending her clinical response.  MB 7/13/2023 Ming presents for follow-up of gastroparesis and CIC.  She is doing great on Motegrity 2 mg daily with a bowel movement most days of the week.  She still has occasional episodes of nausea, her reflux actually improved as far as her symptoms but her dental appointment reveals poor dentition related to reflux.  She agrees to start as omeprazole 20 mg daily.  We will refill her Zofran.  Today she is doing much better, we will appeal for to [facility] coverage of Motegrity given its high expense.  MB 12/4/2023 Lorie presents for follow-up of reflux and gastroparesis with flare.  The September she started applying for grad school and break-up with her boyfriend.  She had increased reflux with gurgling along with nausea and dry heaves and occasional vomiting.  She agrees to increase her as omeprazole to 20 mg twice daily.  She is on metformin for insulin resistance and wants to hold off on weaning this.  She does agree to a trial of Chris OD, we have discussed risk including tardive dyskinesia with metoclopramide.  We will schedule repeat upper endoscopy to rule out eosinophilic esophagitis/gastritis.  We will follow-up pending her response.  Consider a course of Xifaxan, she cannot take amitriptyline due to palpitations associated with this.  MB 1/25/2024 Lorie presents for endoscopy follow-up.  Her EGD reveals mild reflux and gastritis.  She took 2 doses of the Chris Yobany and had severe fatigue.  However she does feel like this is resolved her nausea.  She is on episodes omeprazole 20 mg twice daily.  She had no further vomiting.  She did still has early satiety and some postprandial dyspepsia and nausea but this is improved.  Her bowels are moving daily on Motegrity.  Today she is doing much better.  MB 7/17/24 Lorie presents for follow up of gastroparesis and CIC. She is doing great on esomperazole 20mg BID and motegrity 2mg daily, zofran as needed. She has not had any significant flares. She starts grad school in August and this has taken a huge burden off of her. She is doing great from a GI standpoint. MB

## 2024-09-05 ENCOUNTER — P2P PATIENT RECORD (OUTPATIENT)
Age: 29
End: 2024-09-05

## 2024-12-10 ENCOUNTER — WEB ENCOUNTER (OUTPATIENT)
Dept: URBAN - NONMETROPOLITAN AREA CLINIC 2 | Facility: CLINIC | Age: 29
End: 2024-12-10

## 2024-12-10 RX ORDER — PRUCALOPRIDE 2 MG/1
TAKE 1 TABLET BY MOUTH EVERY DAY TABLET, FILM COATED ORAL
Qty: 90 TABLET | Refills: 3

## 2024-12-11 ENCOUNTER — ERX REFILL RESPONSE (OUTPATIENT)
Dept: URBAN - NONMETROPOLITAN AREA CLINIC 2 | Facility: CLINIC | Age: 29
End: 2024-12-11

## 2024-12-11 RX ORDER — PRUCALOPRIDE 2 MG/1
TAKE 1 TABLET DAILY TABLET, FILM COATED ORAL
Qty: 90 TABLET | Refills: 3 | OUTPATIENT

## 2024-12-11 RX ORDER — PRUCALOPRIDE 2 MG/1
TAKE 1 TABLET DAILY TABLET, FILM COATED ORAL
Qty: 90 TABLET | Refills: 4 | OUTPATIENT

## 2025-01-15 ENCOUNTER — OFFICE VISIT (OUTPATIENT)
Dept: URBAN - METROPOLITAN AREA TELEHEALTH 2 | Facility: TELEHEALTH | Age: 30
End: 2025-01-15
Payer: OTHER GOVERNMENT

## 2025-01-15 DIAGNOSIS — R13.10 DYSPHAGIA: ICD-10-CM

## 2025-01-15 DIAGNOSIS — K59.01 SLOW TRANSIT CONSTIPATION: ICD-10-CM

## 2025-01-15 DIAGNOSIS — K21.9 ESOPHAGEAL REFLUX: ICD-10-CM

## 2025-01-15 DIAGNOSIS — K62.5 RECTAL BLEEDING: ICD-10-CM

## 2025-01-15 DIAGNOSIS — K31.84 GASTROPARESIS: ICD-10-CM

## 2025-01-15 PROCEDURE — 99214 OFFICE O/P EST MOD 30 MIN: CPT | Performed by: NURSE PRACTITIONER

## 2025-01-15 RX ORDER — ESOMEPRAZOLE MAGNESIUM 20 MG/1
1 CAPSULE CAPSULE, DELAYED RELEASE ORAL ONCE A DAY
Qty: 90 CAPSULE | Refills: 3 | Status: ACTIVE | COMMUNITY
Start: 2023-07-13

## 2025-01-15 RX ORDER — METOCLOPRAMIDE HYDROCHLORIDE 15 MG/.07ML
1 SPRAY 30 MINUTES BEFORE MEALS AND AT BEDTIME IN ONE NOSTRIL SPRAY NASAL TWICE DAILY
Qty: 1 UNSPECIFIED | Refills: 3 | Status: ACTIVE | COMMUNITY
Start: 2023-12-04

## 2025-01-15 RX ORDER — ESOMEPRAZOLE MAGNESIUM 20 MG/1
TAKE 1 CAPSULE BY MOUTH TWICE DAILY CAPSULE, DELAYED RELEASE ORAL
Qty: 0 | Refills: 0 | Status: ON HOLD | COMMUNITY
Start: 1900-01-01

## 2025-01-15 RX ORDER — METFORMIN HYDROCHLORIDE 500 MG/1
TAKE 1500MG DAILY TABLET, COATED ORAL
Qty: 0 | Refills: 0 | Status: ON HOLD | COMMUNITY
Start: 1900-01-01

## 2025-01-15 RX ORDER — ONDANSETRON 4 MG/1
1 TABLET ON THE TONGUE AND ALLOW TO DISSOLVE TABLET, ORALLY DISINTEGRATING ORAL
Qty: 60 TABLETS | Refills: 2 | Status: ACTIVE | COMMUNITY
Start: 2022-08-31

## 2025-01-15 RX ORDER — SERTRALINE HCL 50 MG
TAKE 1.5 TABLETS BY ORAL ROUTE DAILY TABLET ORAL 1
Qty: 0 | Refills: 0 | Status: ACTIVE | COMMUNITY
Start: 1900-01-01

## 2025-01-15 RX ORDER — PRUCALOPRIDE 2 MG/1
1 TABLET TABLET, FILM COATED ORAL ONCE A DAY
Qty: 90 TABLET | Refills: 3

## 2025-01-15 RX ORDER — PRUCALOPRIDE 2 MG/1
TAKE 1 TABLET DAILY TABLET, FILM COATED ORAL
Qty: 90 TABLET | Refills: 3 | Status: ACTIVE | COMMUNITY

## 2025-01-15 RX ORDER — ONDANSETRON 4 MG/1
1 TABLET ON THE TONGUE AND ALLOW TO DISSOLVE TABLET, ORALLY DISINTEGRATING ORAL
Qty: 60 TABLETS | Refills: 2
Start: 2022-08-31

## 2025-01-15 RX ORDER — ESOMEPRAZOLE MAGNESIUM 20 MG/1
1 CAPSULE CAPSULE, DELAYED RELEASE ORAL TWICE DAILY
Qty: 180 | Refills: 3
Start: 2023-07-13

## 2025-01-15 RX ORDER — SPIRONOLACTONE 50 MG/1
TAKE 1.5 TABLETS BY ORAL ROUTE DAILY TABLET, FILM COATED ORAL 1
Qty: 0 | Refills: 0 | Status: ON HOLD | COMMUNITY
Start: 1900-01-01

## 2025-01-15 RX ORDER — ONDANSETRON HYDROCHLORIDE 4 MG/1
1 TABLET TABLET, FILM COATED ORAL
Qty: 60 TABLET | Refills: 1 | Status: ACTIVE | COMMUNITY
Start: 2024-01-25

## 2025-01-15 RX ORDER — ONDANSETRON HYDROCHLORIDE 4 MG/1
1 TABLET TABLET, FILM COATED ORAL ONCE A DAY
Qty: 30 TABLET | Refills: 2 | Status: ACTIVE | COMMUNITY
Start: 2022-02-24

## 2025-01-15 RX ORDER — ESOMEPRAZOLE MAGNESIUM 20 MG/1
1 CAPSULE CAPSULE, DELAYED RELEASE ORAL TWICE DAILY
Qty: 180 CAPSULES | Refills: 3 | Status: ACTIVE | COMMUNITY
Start: 2023-01-12

## 2025-01-15 RX ORDER — ONDANSETRON 4 MG/1
1 TABLET ON THE TONGUE AND ALLOW TO DISSOLVE TABLET, ORALLY DISINTEGRATING ORAL
Qty: 60 TABLET | Refills: 3 | Status: ACTIVE | COMMUNITY
Start: 2023-07-13

## 2025-01-15 NOTE — HPI-TODAY'S VISIT:
1/5/2022 Ming is referred by Dr. Louie Barajas for consultation of rectal bleeding and rectal pain. A copy of this referral will be sent to the referring MD.  Over the past 6 months she has had bright red blood per rectum with bowel movements along with a tearing sensation.  At times she will may bleed for several hours.  She has a bowel movement most days.  She does take Colace as needed.  We saw her back in 2016 and diagnosed her with gastroparesis.  She has been managing this with small meals and amitriptyline.  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  She is on multiple anticholinergic medications.  She does think that the Colace seems to help, she did not know she could take this regularly.  She is never had a colonoscopy, she has no weight loss or family history.  Today she is doing well otherwise but is concerned about the bleeding.  MB  2/24/2022 Ming presents for follow-up of rectal bleeding.  Since her last visit her colonoscopy is normal other than a fissure.  She is started eating oatmeal in the mornings and is having a more regular bowel movement but still has to strain at times.  She has failed MiraLAX and Colace in the past.  She would like to give Trulance 3 mg a try.  She does have nausea vomiting with her migraines and after binge drinking.  She requests a refill of Zofran.  Today she is doing well otherwise with no new GI complaints.  She would like to try Trulance.  MB 1/12/2023 The patient presents for follow-up of reflux gastroparesis and constipation.  Since her last visit the Trulance was $200 a month.  She states that this did help her symptoms but was too expensive.  She has failed stool softeners and MiraLAX in the past.  She states she is recently had a flare of reflux.  She is not taking anything regularly for the symptoms.  Her last EGD was done in 2016 with retained food.  This was not done locally.  Today she agrees to start low-dose PPI.  We will pursue prescription for Motegrity given her gastroparesis and Meghan-Danlos.  If this is not covered consider appeal for Trulance with tier exception versus [employer].  We will follow-up pending her clinical response.  MB 7/13/2023 Ming presents for follow-up of gastroparesis and CIC.  She is doing great on Motegrity 2 mg daily with a bowel movement most days of the week.  She still has occasional episodes of nausea, her reflux actually improved as far as her symptoms but her dental appointment reveals poor dentition related to reflux.  She agrees to start as omeprazole 20 mg daily.  We will refill her Zofran.  Today she is doing much better, we will appeal for to [facility] coverage of Motegrity given its high expense.  MB 12/4/2023 Lorie presents for follow-up of reflux and gastroparesis with flare.  The September she started applying for grad school and break-up with her boyfriend.  She had increased reflux with gurgling along with nausea and dry heaves and occasional vomiting.  She agrees to increase her as omeprazole to 20 mg twice daily.  She is on metformin for insulin resistance and wants to hold off on weaning this.  She does agree to a trial of Chris OD, we have discussed risk including tardive dyskinesia with metoclopramide.  We will schedule repeat upper endoscopy to rule out eosinophilic esophagitis/gastritis.  We will follow-up pending her response.  Consider a course of Xifaxan, she cannot take amitriptyline due to palpitations associated with this.  MB 1/25/2024 Lorie presents for endoscopy follow-up.  Her EGD reveals mild reflux and gastritis.  She took 2 doses of the Chris Yobany and had severe fatigue.  However she does feel like this is resolved her nausea.  She is on episodes omeprazole 20 mg twice daily.  She had no further vomiting.  She did still has early satiety and some postprandial dyspepsia and nausea but this is improved.  Her bowels are moving daily on Motegrity.  Today she is doing much better.  MB 7/17/24 Lorie presents for follow up of gastroparesis and CIC. She is doing great on esomperazole 20mg BID and motegrity 2mg daily, zofran as needed. She has not had any significant flares. She starts grad school in August and this has taken a huge burden off of her. She is doing great from a GI standpoint. MB 1/15/2025 Lorie presents for follow-up.  Since her last visit she has had an increase in her anxiety and has been placed on Valium.  She has had a flare of her gastroparesis with nausea and early satiety.  We discussed that the benzodiazepine is likely contributing.  She wants to avoid Reglan, we have discussed that she can try FD guard as needed or limit the amount of fat and fiber in her meals.  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  She is very excited about this.  She has been under a fair amount of stress with her mom being sick.  Today she is doing about the same overall.  She is taking her Nexium 20 mg twice a day and Motegrity 2 mg daily.  MB

## 2025-03-10 ENCOUNTER — P2P PATIENT RECORD (OUTPATIENT)
Age: 30
End: 2025-03-10

## 2025-07-07 ENCOUNTER — WEB ENCOUNTER (OUTPATIENT)
Dept: URBAN - NONMETROPOLITAN AREA CLINIC 2 | Facility: CLINIC | Age: 30
End: 2025-07-07

## 2025-07-09 ENCOUNTER — WEB ENCOUNTER (OUTPATIENT)
Dept: URBAN - NONMETROPOLITAN AREA CLINIC 2 | Facility: CLINIC | Age: 30
End: 2025-07-09

## 2025-07-16 ENCOUNTER — OFFICE VISIT (OUTPATIENT)
Dept: URBAN - NONMETROPOLITAN AREA CLINIC 2 | Facility: CLINIC | Age: 30
End: 2025-07-16

## 2025-07-30 ENCOUNTER — OFFICE VISIT (OUTPATIENT)
Dept: URBAN - NONMETROPOLITAN AREA CLINIC 2 | Facility: CLINIC | Age: 30
End: 2025-07-30
Payer: OTHER GOVERNMENT

## 2025-07-30 DIAGNOSIS — K21.9 ESOPHAGEAL REFLUX: ICD-10-CM

## 2025-07-30 DIAGNOSIS — K59.04 CHRONIC IDIOPATHIC CONSTIPATION: ICD-10-CM

## 2025-07-30 DIAGNOSIS — K31.84 GASTROPARESIS: ICD-10-CM

## 2025-07-30 DIAGNOSIS — K62.5 RECTAL BLEEDING: ICD-10-CM

## 2025-07-30 DIAGNOSIS — R13.10 DYSPHAGIA: ICD-10-CM

## 2025-07-30 PROBLEM — 82934008: Status: ACTIVE | Noted: 2025-07-30

## 2025-07-30 PROCEDURE — 99214 OFFICE O/P EST MOD 30 MIN: CPT | Performed by: NURSE PRACTITIONER

## 2025-07-30 RX ORDER — SPIRONOLACTONE 50 MG/1
TAKE 1.5 TABLETS BY ORAL ROUTE DAILY TABLET, FILM COATED ORAL 1
Qty: 0 | Refills: 0 | Status: ON HOLD | COMMUNITY
Start: 1900-01-01

## 2025-07-30 RX ORDER — METFORMIN HYDROCHLORIDE 500 MG/1
TAKE 1500MG DAILY TABLET, COATED ORAL
Qty: 0 | Refills: 0 | Status: ON HOLD | COMMUNITY
Start: 1900-01-01

## 2025-07-30 RX ORDER — ONDANSETRON 4 MG/1
1 TABLET ON THE TONGUE AND ALLOW TO DISSOLVE TABLET, ORALLY DISINTEGRATING ORAL
Qty: 60 TABLETS | Refills: 2 | Status: ACTIVE | COMMUNITY
Start: 2022-08-31

## 2025-07-30 RX ORDER — METRONIDAZOLE 7.5 MG/G
1 APPLICATION CREAM TOPICAL TWICE A DAY
Qty: 45 | Refills: 1 | OUTPATIENT
Start: 2025-07-30 | End: 2025-08-27

## 2025-07-30 RX ORDER — GUANFACINE 4 MG/1
AS DIRECTED TABLET, EXTENDED RELEASE ORAL
Status: ACTIVE | COMMUNITY

## 2025-07-30 RX ORDER — ONDANSETRON 4 MG/1
1 TABLET ON THE TONGUE AND ALLOW TO DISSOLVE TABLET, ORALLY DISINTEGRATING ORAL
Qty: 60 TABLET | Refills: 3 | Status: ACTIVE | COMMUNITY
Start: 2023-07-13

## 2025-07-30 RX ORDER — ESOMEPRAZOLE MAGNESIUM 20 MG/1
1 CAPSULE CAPSULE, DELAYED RELEASE ORAL TWICE DAILY
Qty: 180 | Refills: 3 | Status: ACTIVE | COMMUNITY
Start: 2023-07-13

## 2025-07-30 RX ORDER — ESOMEPRAZOLE MAGNESIUM 20 MG/1
TAKE 1 CAPSULE BY MOUTH TWICE DAILY CAPSULE, DELAYED RELEASE ORAL
Qty: 0 | Refills: 0 | Status: ON HOLD | COMMUNITY
Start: 1900-01-01

## 2025-07-30 RX ORDER — SERTRALINE HCL 50 MG
TAKE 1.5 TABLETS BY ORAL ROUTE DAILY TABLET ORAL 1
Qty: 0 | Refills: 0 | Status: ACTIVE | COMMUNITY
Start: 1900-01-01

## 2025-07-30 RX ORDER — ONDANSETRON HYDROCHLORIDE 4 MG/1
1 TABLET TABLET, FILM COATED ORAL
Qty: 60 TABLET | Refills: 1 | Status: ACTIVE | COMMUNITY
Start: 2024-01-25

## 2025-07-30 RX ORDER — ONDANSETRON HYDROCHLORIDE 4 MG/1
1 TABLET TABLET, FILM COATED ORAL ONCE A DAY
Qty: 30 TABLET | Refills: 2 | Status: ACTIVE | COMMUNITY
Start: 2022-02-24

## 2025-07-30 RX ORDER — PRUCALOPRIDE 2 MG/1
1 TABLET TABLET, FILM COATED ORAL ONCE A DAY
Qty: 90 TABLET | Refills: 3 | Status: ACTIVE | COMMUNITY

## 2025-07-30 RX ORDER — PRUCALOPRIDE 2 MG/1
1 TABLET TABLET, FILM COATED ORAL ONCE A DAY
Qty: 90 TABLET | Refills: 3

## 2025-07-30 RX ORDER — ESOMEPRAZOLE MAGNESIUM 20 MG/1
1 CAPSULE CAPSULE, DELAYED RELEASE ORAL TWICE DAILY
Qty: 180 CAPSULES | Refills: 3 | Status: ACTIVE | COMMUNITY
Start: 2023-01-12

## 2025-07-30 RX ORDER — METOCLOPRAMIDE HYDROCHLORIDE 15 MG/.07ML
1 SPRAY 30 MINUTES BEFORE MEALS AND AT BEDTIME IN ONE NOSTRIL SPRAY NASAL TWICE DAILY
Qty: 1 UNSPECIFIED | Refills: 3 | Status: ACTIVE | COMMUNITY
Start: 2023-12-04

## 2025-07-30 NOTE — HPI-TODAY'S VISIT:
7/30/2025 Alvarez Zaragoza, a 29-year-old female, presented for follow-up of rectal bleeding and chronic gastrointestinal symptoms. She reported a recent rare episode of bright red blood with diarrhea, without pain or hard stool, and linked it to her history of hemorrhoids and fissure, previously evaluated by colonoscopy in 2022. She suspects prolonged sitting for school may contribute to her symptoms and is monitoring for recurrence. She also described a long-standing pattern of alternating diarrhea and constipation, preferring loose stools to avoid feeling backed up. With Motegrity, she now has daily bowel movements, though travel can disrupt her routine. Rarely, she experiences more severe diarrhea. Her gastroparesis and reflux are generally well controlled, with occasional gurgling and appetite changes during stress. She sometimes misses pills when stressed but follows dietary adjustments as needed. She has gained weight since January and continues to take vitamins as recommended. MB